# Patient Record
Sex: MALE | Race: WHITE | NOT HISPANIC OR LATINO | URBAN - METROPOLITAN AREA
[De-identification: names, ages, dates, MRNs, and addresses within clinical notes are randomized per-mention and may not be internally consistent; named-entity substitution may affect disease eponyms.]

---

## 2017-04-25 ENCOUNTER — HOSPITAL ENCOUNTER (EMERGENCY)
Facility: CLINIC | Age: 32
Discharge: HOME | End: 2017-04-25
Attending: EMERGENCY MEDICINE | Admitting: EMERGENCY MEDICINE
Payer: COMMERCIAL

## 2017-04-25 ENCOUNTER — APPOINTMENT (OUTPATIENT)
Dept: ULTRASOUND IMAGING | Facility: CLINIC | Age: 32
End: 2017-04-25
Attending: EMERGENCY MEDICINE
Payer: COMMERCIAL

## 2017-04-25 ENCOUNTER — TRANSFERRED RECORDS (OUTPATIENT)
Dept: HEALTH INFORMATION MANAGEMENT | Facility: CLINIC | Age: 32
End: 2017-04-25

## 2017-04-25 VITALS
SYSTOLIC BLOOD PRESSURE: 137 MMHG | TEMPERATURE: 99 F | DIASTOLIC BLOOD PRESSURE: 81 MMHG | RESPIRATION RATE: 16 BRPM | BODY MASS INDEX: 27.12 KG/M2 | OXYGEN SATURATION: 98 % | HEART RATE: 69 BPM | WEIGHT: 200 LBS

## 2017-04-25 DIAGNOSIS — R10.32 LEFT LOWER QUADRANT PAIN: ICD-10-CM

## 2017-04-25 LAB
ALBUMIN UR-MCNC: NEGATIVE MG/DL
ANION GAP SERPL CALCULATED.3IONS-SCNC: 4 MMOL/L (ref 3–14)
APPEARANCE UR: CLEAR
BACTERIA #/AREA URNS HPF: ABNORMAL /HPF
BILIRUB UR QL STRIP: NEGATIVE
BUN SERPL-MCNC: 16 MG/DL (ref 7–30)
CALCIUM SERPL-MCNC: 8.6 MG/DL (ref 8.5–10.1)
CHLORIDE SERPL-SCNC: 103 MMOL/L (ref 94–109)
CO2 SERPL-SCNC: 30 MMOL/L (ref 20–32)
COLOR UR AUTO: YELLOW
CREAT SERPL-MCNC: 1.02 MG/DL (ref 0.66–1.25)
GFR SERPL CREATININE-BSD FRML MDRD: 85 ML/MIN/1.7M2
GLUCOSE SERPL-MCNC: 125 MG/DL (ref 70–99)
GLUCOSE UR STRIP-MCNC: NEGATIVE MG/DL
HGB UR QL STRIP: ABNORMAL
KETONES UR STRIP-MCNC: NEGATIVE MG/DL
LEUKOCYTE ESTERASE UR QL STRIP: NEGATIVE
MUCOUS THREADS #/AREA URNS LPF: PRESENT /LPF
NITRATE UR QL: NEGATIVE
PH UR STRIP: 6 PH (ref 5–7)
POTASSIUM SERPL-SCNC: 3.8 MMOL/L (ref 3.4–5.3)
RBC #/AREA URNS AUTO: 2 /HPF (ref 0–2)
SODIUM SERPL-SCNC: 137 MMOL/L (ref 133–144)
SP GR UR STRIP: 1.01 (ref 1–1.03)
URN SPEC COLLECT METH UR: ABNORMAL
UROBILINOGEN UR STRIP-MCNC: 0 MG/DL (ref 0–2)
WBC #/AREA URNS AUTO: 1 /HPF (ref 0–2)

## 2017-04-25 PROCEDURE — 81001 URINALYSIS AUTO W/SCOPE: CPT | Performed by: EMERGENCY MEDICINE

## 2017-04-25 PROCEDURE — 96374 THER/PROPH/DIAG INJ IV PUSH: CPT

## 2017-04-25 PROCEDURE — 25000128 H RX IP 250 OP 636: Performed by: EMERGENCY MEDICINE

## 2017-04-25 PROCEDURE — 76770 US EXAM ABDO BACK WALL COMP: CPT

## 2017-04-25 PROCEDURE — 99284 EMERGENCY DEPT VISIT MOD MDM: CPT | Mod: 25

## 2017-04-25 PROCEDURE — 80048 BASIC METABOLIC PNL TOTAL CA: CPT | Performed by: EMERGENCY MEDICINE

## 2017-04-25 RX ORDER — KETOROLAC TROMETHAMINE 30 MG/ML
30 INJECTION, SOLUTION INTRAMUSCULAR; INTRAVENOUS ONCE
Status: COMPLETED | OUTPATIENT
Start: 2017-04-25 | End: 2017-04-25

## 2017-04-25 RX ORDER — DICYCLOMINE HCL 20 MG
20 TABLET ORAL 4 TIMES DAILY PRN
Qty: 16 TABLET | Refills: 0 | Status: SHIPPED | OUTPATIENT
Start: 2017-04-25 | End: 2017-05-05

## 2017-04-25 RX ADMIN — KETOROLAC TROMETHAMINE 30 MG: 30 INJECTION, SOLUTION INTRAMUSCULAR at 19:40

## 2017-04-25 ASSESSMENT — ENCOUNTER SYMPTOMS
NAUSEA: 1
CONSTIPATION: 0
HEMATURIA: 0
BLOOD IN STOOL: 0
ABDOMINAL PAIN: 1
DIARRHEA: 0
VOMITING: 0
FEVER: 0
DYSURIA: 0

## 2017-04-25 NOTE — ED AVS SNAPSHOT
[facility] Emergency Department    [address]    [facility] [address]    Phone:  [phone]    Fax:  [phone]                                       Bernardo Elliott   MRN: [id number]    Department:  [facility] Emergency Department   Date of Visit:  4/25/2017           After Visit Summary Signature Page     I have received my discharge instructions, and my questions have been answered. I have discussed any challenges I see with this plan with the nurse or doctor.    ..........................................................................................................................................  Patient/Patient Representative Signature      ..........................................................................................................................................  Patient Representative Print Name and Relationship to Patient    ..................................................               ................................................  Date                                            Time    ..........................................................................................................................................  Reviewed by Signature/Title    ...................................................              ..............................................  Date                                                            Time

## 2017-04-25 NOTE — ED AVS SNAPSHOT
[facility] Emergency Department    [address] [place] [address]    Phone:  [phone]    Fax:  [phone]                                       Bernardo Elliott   MRN: [id number]    Department:  [facility] Emergency Department   Date of Visit:  4/25/2017           Patient Information     Date Of Birth          1985        Your diagnoses for this visit were:     Left lower quadrant pain        You were seen by Cb Rivera MD.        Discharge Instructions       Please make an appointment to follow up with [facility] [phone] in 3-5 days even if entirely better.      Discharge Instructions  Abdominal Pain    Abdominal pain can be caused by many things. Your evaluation today does not show the exact cause for your pain. Your doctor today has decided that it is unlikely your pain is due to a life threatening problem, or a problem requiring surgery or hospital admission. Sometimes those problems cannot be found right away, so it is very important that you follow up as directed.  Sometimes only the changes which occur over time allow the cause of your pain to be found.    Return to the Emergency Department for a recheck in 8-12 hours if your pain continues.  If your pain gets worse, changes in location, or feels different, return to the Emergency Department right away.    ADULTS:  Return to the Emergency Department right away if:      You get an oral temperature above 102oF or as directed by your doctor.    You have blood in your stools (bright red or black, tarry stools).    You keep throwing up or can t drink liquids.    You see blood when you throw up.    You can t have a bowel movement or you can t pass gas.    Your stomach gets bloated or bigger.    Your skin or the whites of your eyes look yellow.    You faint.    You have bloody, frequent or painful urination.    You have new symptoms or anything that worries you.    CHILDREN:  Return to  the Emergency Department right away if your child has any of the above-listed symptoms or the following:      Pushes your hand away or screams/cries when his/her belly is touched.    You notice your child is very fussy or weak.    Your child is very tired and is too tired to eat or drink.    Your child is dehydrated.  Signs of dehydration can be:  o Your infant has had no wet diapers in 4-5 hours.  o Your older child has not passed urine in 6-8 hours.  o Your infant or child starts to have dry mouth and lips, or no saliva or tears.    PREGNANT WOMEN:  Return to the Emergency Department right away if you have any of the above-listed symptoms or the following:      You have bleeding, leaking fluid or passing tissue from the vagina.    You have worse pain or cramping, or pain in your shoulder or back.    You have vomiting that will not stop.    You have painful or bloody urination.    You have a temperature of 100oF or more.    Your baby is not moving as much as usual.    You faint.    You get a bad headache with or without eye problems and abdominal pain.    You have a convulsion or seizure.    You have unusual discharge from your vagina and abdominal pain.    Abdominal pain is pretty common during pregnancy.  Your pain may or may not be related to your pregnancy. You should follow-up closely with your OB doctor so they can evaluate you and your baby.  Until you follow-up with your regular doctor, do the following:       Avoid sex and do not put anything in your vagina.    Drink clear fluids.    Only take medications approved by your doctor.    MORE INFORMATION:    Appendicitis:  A possible cause of abdominal pain in any person who still has their appendix is acute appendicitis. Appendicitis is often hard to diagnose.  Testing does not always rule out early appendicitis or other causes of abdominal pain. Close follow-up with your doctor and re-evaluations may be needed to figure out the reason for your abdominal  "pain.    Follow-up:  It is very important that you make an appointment with your clinic and go to the appointment.  If you do not follow-up with your primary doctor, it may result in missing an important development which could result in permanent injury or disability and/or lasting pain.  If there is any problem keeping your appointment, call your doctor or return to the Emergency Department.    Medications:  Take your medications as directed by your doctor today.  Before using over-the-counter medications, ask your doctor and make sure to take the medications as directed.  If you have any questions about medications, ask your doctor.    Diet:  Resume your normal diet as much as possible, but do not eat fried, fatty or spicy foods while you have pain.  Do not drink alcohol or have caffeine.  Do not smoke tobacco.    Probiotics: If you have been given an antibiotic, you may want to also take a probiotic pill or eat yogurt with live cultures. Probiotics have \"good bacteria\" to help your intestines stay healthy. Studies have shown that probiotics help prevent diarrhea and other intestine problems (including C. diff infection) when you take antibiotics. You can buy these without a prescription in the pharmacy section of the store.     If you were given a prescription for medicine here today, be sure to read all of the information (including the package insert) that comes with your prescription.  This will include important information about the medicine, its side effects, and any warnings that you need to know about.  The pharmacist who fills the prescription can provide more information and answer questions you may have about the medicine.  If you have questions or concerns that the pharmacist cannot address, please call or return to the Emergency Department.           24 Hour Appointment Hotline       To make an appointment at any [facility], call [phone] ([phone]). If you don't have a family " doctor or clinic, we will help you find one. [facility] clinics are conveniently located to serve the needs of you and your family.             Review of your medicines      START taking        Dose / Directions Last dose taken    dicyclomine 20 MG tablet   Commonly known as:  BENTYL   Dose:  20 mg   Quantity:  16 tablet        Take 1 tablet (20 mg) by mouth 4 times daily as needed   Refills:  0          Our records show that you are taking the medicines listed below. If these are incorrect, please call your family doctor or clinic.        Dose / Directions Last dose taken    * CYMBALTA PO   Dose:  60 mg        Take 60 mg by mouth   Refills:  0        * DULoxetine 60 MG EC capsule   Commonly known as:  CYMBALTA   Dose:  60 mg   Quantity:  30 capsule        Take 1 capsule (60 mg) by mouth daily   Refills:  0        * MOBIC PO        Refills:  0        * meloxicam 7.5 MG tablet   Commonly known as:  MOBIC   Dose:  7.5 mg   Quantity:  30 tablet        Take 1 tablet (7.5 mg) by mouth daily   Refills:  1        * oxyCODONE 10 MG 12 hr tablet   Commonly known as:  OxyCONTIN   Dose:  10 mg        Take 10 mg by mouth every 12 hours   Refills:  0        * OXYCONTIN PO   Dose:  15 mg        Take 15 mg by mouth   Refills:  0        * oxyCODONE 20 MG 12 hr tablet   Commonly known as:  OxyCONTIN   Dose:  20 mg   Quantity:  60 tablet        Take 1 tablet (20 mg) by mouth every 12 hours   Refills:  0        VIAGRA PO   Dose:  100 mg        Take 100 mg by mouth as needed   Refills:  0        * Notice:  This list has 7 medication(s) that are the same as other medications prescribed for you. Read the directions carefully, and ask your doctor or other care provider to review them with you.            Prescriptions were sent or printed at these locations (1 Prescription)                   Other Prescriptions                Printed at Department/Unit printer (1 of 1)         dicyclomine (BENTYL) 20 MG tablet                Procedures and  tests performed during your visit     Basic metabolic panel (BMP)    Peripheral IV catheter    Retroperitoneal US    UA with Microscopic      Orders Needing Specimen Collection     None      Pending Results     Date and Time Order Name Status Description    4/25/2017 1928 Retroperitoneal US Preliminary             Pending Culture Results     No orders found from 4/23/2017 to 4/26/2017.            Test Results From Your Hospital Stay        4/25/2017  9:13 PM      Component Results     Component Value Ref Range & Units Status    Color Urine Yellow  Final    Appearance Urine Clear  Final    Glucose Urine Negative NEG mg/dL Final    Bilirubin Urine Negative NEG Final    Ketones Urine Negative NEG mg/dL Final    Specific Gravity Urine 1.012 1.003 - 1.035 Final    Blood Urine Small (A) NEG Final    pH Urine 6.0 5.0 - 7.0 pH Final    Protein Albumin Urine Negative NEG mg/dL Final    Urobilinogen mg/dL 0.0 0.0 - 2.0 mg/dL Final    Nitrite Urine Negative NEG Final    Leukocyte Esterase Urine Negative NEG Final    Source Midstream Urine  Final    WBC Urine 1 0 - 2 /HPF Final    RBC Urine 2 0 - 2 /HPF Final    Bacteria Urine Few (A) NEG /HPF Final    Mucous Urine Present (A) NEG /LPF Final         4/25/2017  7:59 PM      Component Results     Component Value Ref Range & Units Status    Sodium 137 133 - 144 mmol/L Final    Potassium 3.8 3.4 - 5.3 mmol/L Final    Chloride 103 94 - 109 mmol/L Final    Carbon Dioxide 30 20 - 32 mmol/L Final    Anion Gap 4 3 - 14 mmol/L Final    Glucose 125 (H) 70 - 99 mg/dL Final    Urea Nitrogen 16 7 - 30 mg/dL Final    Creatinine 1.02 0.66 - 1.25 mg/dL Final    GFR Estimate 85 >60 mL/min/1.7m2 Final    Non  GFR Calc    GFR Estimate If Black >90   GFR Calc   >60 mL/min/1.7m2 Final    Calcium 8.6 8.5 - 10.1 mg/dL Final         4/25/2017  8:36 PM      Narrative     US RENAL COMPLETE 4/25/2017 8:25 PM     HISTORY: LLQ pain. Evaluate for ureteral stone.    TECHNIQUE:  Renal ultrasound.    FINDINGS: Both kidneys are normal in size, shape, and echotexture. The  right kidney measures 13.3 x 4.9 x 5.8 cm with AP cortical thickness  of 1.5 cm. The left kidney measures 13.3 x 6.3 x 4.9 cm with AP  cortical thickness of 1.7 cm. There is no evidence for hydronephrosis.  No cystic or solid masses are present. There is no evidence for  urolithiasis. The bladder is distended and appears normal. Bilateral  ureteral jets are visualized.        Impression     IMPRESSION: Negative renal ultrasound.                  Clinical Quality Measure: Blood Pressure Screening     Your blood pressure was checked while you were in the emergency department today. The last reading we obtained was  BP: 137/81 . Please read the guidelines below about what these numbers mean and what you should do about them.  If your systolic blood pressure (the top number) is less than 120 and your diastolic blood pressure (the bottom number) is less than 80, then your blood pressure is normal. There is nothing more that you need to do about it.  If your systolic blood pressure (the top number) is 120-139 or your diastolic blood pressure (the bottom number) is 80-89, your blood pressure may be higher than it should be. You should have your blood pressure rechecked within a year by a primary care provider.  If your systolic blood pressure (the top number) is 140 or greater or your diastolic blood pressure (the bottom number) is 90 or greater, you may have high blood pressure. High blood pressure is treatable, but if left untreated over time it can put you at risk for heart attack, stroke, or kidney failure. You should have your blood pressure rechecked by a primary care provider within the next 4 weeks.  If your provider in the emergency department today gave you specific instructions to follow-up with your doctor or provider even sooner than that, you should follow that instruction and not wait for up to 4 weeks for your  "follow-up visit.        Thank you for choosing [facility]       Thank you for choosing [facility] for your care. Our goal is always to provide you with excellent care. Hearing back from our patients is one way we can continue to improve our services. Please take a few minutes to complete the written survey that you may receive in the mail after you visit with us. Thank you!        [employer] Information     [email or web address] lets you send messages to your doctor, view your test results, renew your prescriptions, schedule appointments and more. To sign up, go to [email or web address] . Click on \"Log in\" on the left side of the screen, which will take you to the Welcome page. Then click on \"Sign up Now\" on the right side of the page.     You will be asked to enter the access code listed below, as well as some personal information. Please follow the directions to create your username and password.     Your access code is: [email or web address]  Expires: 2017  9:25 PM     Your access code will  in 90 days. If you need help or a new code, please call your [facility] clinic or [phone].        Care EveryWhere ID     This is your Care EveryWhere ID. This could be used by other organizations to access your [facility] medical records  [id number]        After Visit Summary       This is your record. Keep this with you and show to your community pharmacist(s) and doctor(s) at your next visit.                  "

## 2017-04-26 NOTE — DISCHARGE INSTRUCTIONS
Please make an appointment to follow up with [facility] [phone] in 3-5 days even if entirely better.      Discharge Instructions  Abdominal Pain    Abdominal pain can be caused by many things. Your evaluation today does not show the exact cause for your pain. Your doctor today has decided that it is unlikely your pain is due to a life threatening problem, or a problem requiring surgery or hospital admission. Sometimes those problems cannot be found right away, so it is very important that you follow up as directed.  Sometimes only the changes which occur over time allow the cause of your pain to be found.    Return to the Emergency Department for a recheck in 8-12 hours if your pain continues.  If your pain gets worse, changes in location, or feels different, return to the Emergency Department right away.    ADULTS:  Return to the Emergency Department right away if:      You get an oral temperature above 102oF or as directed by your doctor.    You have blood in your stools (bright red or black, tarry stools).    You keep throwing up or can t drink liquids.    You see blood when you throw up.    You can t have a bowel movement or you can t pass gas.    Your stomach gets bloated or bigger.    Your skin or the whites of your eyes look yellow.    You faint.    You have bloody, frequent or painful urination.    You have new symptoms or anything that worries you.    CHILDREN:  Return to the Emergency Department right away if your child has any of the above-listed symptoms or the following:      Pushes your hand away or screams/cries when his/her belly is touched.    You notice your child is very fussy or weak.    Your child is very tired and is too tired to eat or drink.    Your child is dehydrated.  Signs of dehydration can be:  o Your infant has had no wet diapers in 4-5 hours.  o Your older child has not passed urine in 6-8 hours.  o Your infant or child starts to have dry mouth and lips, or no  saliva or tears.    PREGNANT WOMEN:  Return to the Emergency Department right away if you have any of the above-listed symptoms or the following:      You have bleeding, leaking fluid or passing tissue from the vagina.    You have worse pain or cramping, or pain in your shoulder or back.    You have vomiting that will not stop.    You have painful or bloody urination.    You have a temperature of 100oF or more.    Your baby is not moving as much as usual.    You faint.    You get a bad headache with or without eye problems and abdominal pain.    You have a convulsion or seizure.    You have unusual discharge from your vagina and abdominal pain.    Abdominal pain is pretty common during pregnancy.  Your pain may or may not be related to your pregnancy. You should follow-up closely with your OB doctor so they can evaluate you and your baby.  Until you follow-up with your regular doctor, do the following:       Avoid sex and do not put anything in your vagina.    Drink clear fluids.    Only take medications approved by your doctor.    MORE INFORMATION:    Appendicitis:  A possible cause of abdominal pain in any person who still has their appendix is acute appendicitis. Appendicitis is often hard to diagnose.  Testing does not always rule out early appendicitis or other causes of abdominal pain. Close follow-up with your doctor and re-evaluations may be needed to figure out the reason for your abdominal pain.    Follow-up:  It is very important that you make an appointment with your clinic and go to the appointment.  If you do not follow-up with your primary doctor, it may result in missing an important development which could result in permanent injury or disability and/or lasting pain.  If there is any problem keeping your appointment, call your doctor or return to the Emergency Department.    Medications:  Take your medications as directed by your doctor today.  Before using over-the-counter medications, ask your  "doctor and make sure to take the medications as directed.  If you have any questions about medications, ask your doctor.    Diet:  Resume your normal diet as much as possible, but do not eat fried, fatty or spicy foods while you have pain.  Do not drink alcohol or have caffeine.  Do not smoke tobacco.    Probiotics: If you have been given an antibiotic, you may want to also take a probiotic pill or eat yogurt with live cultures. Probiotics have \"good bacteria\" to help your intestines stay healthy. Studies have shown that probiotics help prevent diarrhea and other intestine problems (including C. diff infection) when you take antibiotics. You can buy these without a prescription in the pharmacy section of the store.     If you were given a prescription for medicine here today, be sure to read all of the information (including the package insert) that comes with your prescription.  This will include important information about the medicine, its side effects, and any warnings that you need to know about.  The pharmacist who fills the prescription can provide more information and answer questions you may have about the medicine.  If you have questions or concerns that the pharmacist cannot address, please call or return to the Emergency Department.         "

## 2017-04-26 NOTE — ED PROVIDER NOTES
History     Chief Complaint:  Abdominal Pain      HPI   Bernardo Elliott is a 31 year old male who presents with left lower quadrant abdominal pain. The patient reports yesterday noon after using the bathroom he developed sudden onset of left lower quadrant abdominal pain. His pain has been constant but worsens intermittently. When his pain worsens he does note some minor nausea and lying on his stomach helps. He was seen at urgent care earlier today for his discomfort but presents here for ongoing pain. He denies any radiation of his pain into the back or his groin/testicles. The patient noticed while driving his pain worsened. He denies fevers, vomiting, dysuria, diarrhea, constipation, bloody stools, or any associated symptoms.     Allergies:  The patient has no known allergies to medications.      Medications:    Oxycodone  Cymbalta  Viagra  Cymbalta  Meloxicam     Past Medical History:    Chronic back pain  Controlled substance agreement signed    Past Surgical History:    The patient has no pertinent surgical history.    Family History:    The patient has no pertinent family history.     Social History:  .  The patient currently smokes 1.00 ppd.  The patient consumes alcohol rarely.     Review of Systems   Constitutional: Negative for fever.   Gastrointestinal: Positive for abdominal pain and nausea. Negative for blood in stool, constipation, diarrhea and vomiting.   Genitourinary: Negative for dysuria and hematuria.   All other systems reviewed and are negative.    Physical Exam   First Vitals:  BP: (!) 144/92  Pulse: 87  Heart Rate: 87  Temp: 99  F (37.2  C)  Resp: 18  Weight: 90.7 kg (200 lb)  SpO2: 98 %    Physical Exam    Constitutional:  Pleasant, age appropriate.       Resting comfortably in the bed in no obvious pain.  HEENT:    Oropharynx is moist, without lesions or trismus.  Eyes:    Conjunctiva normal, PERRL  Neck:    Supple, no meningismus.     CV:     Regular rate and rhythm.      No  murmurs, rubs or gallops.     No lower extremity edema.  PULM:    Clear to auscultation bilateral.       No respiratory distress.      Good air exchange.     No rales or wheezing.     No stridor.  ABD:    Soft, non-distended.       Minimal tenderness in the LLQ.     Bowel sounds normal.     No pulsatile masses.       No rebound, guarding or rigidity.     No CVA tenderness.      No hepatosplenomegaly.  MSK:     No gross deformity to all four extremities.   LYMPH:   No cervical lymphadenopathy.  NEURO:   Alert.  Good muscular tone, no atrophy.   Skin:    Warm, dry and intact.    Psych:    Mood is good and affect is appropriate.      Emergency Department Course   Imaging:  Radiographic findings were communicated with the patient who voiced understanding of the findings.    US Retroperitoneal:  Negative renal ultrasound.  Results per radiology.     Laboratory:  BMP:  (H), ow wnl (Creat 1.02)  UA with Micro: Blood small, Bacteria few, mucous present, ow wnl    Interventions:  Toradol 30 mg IV    Emergency Department Course:  Nursing notes and vitals reviewed.  I performed an exam of the patient as documented above.     The work up above was undertaken.     The patient received the intervention(s) above.     Findings and plan explained to the Patient. Patient discharged home with instructions regarding supportive care, medications, and reasons to return. The importance of close follow-up was reviewed.     Impression & Plan    Medical Decision Making:  Bernardo Elliott is a 31 year old male who presents to the emergency department with left lower quadrant abdominal pain. On examination he appears well. His abdominal examination is benign. UA does not show any signs of infection or significant hematuria. Renal ultrasound with no evidence of hydronephrosis to cause increased concern for ureteral colic. Clinical history is not suggestive of diverticulitis. I do not feel that further advanced imaging with CT scan is  necessary. The differential diagnosis would include mesenteric adenitis, colitis, enteritis, abdominal wall strain. He is safe for discharge to home. Supportive treatment indicated. Follow up with PCP in 3-5 days. Return for worsening symptoms.     Diagnosis:    ICD-10-CM    1. Left lower quadrant pain R10.32        Disposition:  Discharged.    Discharge Medications:  New Prescriptions    DICYCLOMINE (BENTYL) 20 MG TABLET    Take 1 tablet (20 mg) by mouth 4 times daily as needed         Jasmeet SCHNEIDER, am serving as a scribe at 9:23 PM on 4/25/2017 to document services personally performed by Dr. Rivera, based on my observations and the provider's statements to me.    [facility] EMERGENCY DEPARTMENT       Cb Rivera MD  04/26/17 1428

## 2017-04-26 NOTE — ED NOTES
Pt with mid-abdominal pain which started yesterday, denies n/v/d. Denies fevers. ABC's intact, alert and oriented X3.

## 2017-11-10 ENCOUNTER — RECORDS - HEALTHEAST (OUTPATIENT)
Dept: ADMINISTRATIVE | Facility: OTHER | Age: 32
End: 2017-11-10

## 2023-01-01 ENCOUNTER — HOSPITAL ENCOUNTER (EMERGENCY)
Facility: CLINIC | Age: 38
End: 2023-04-14
Attending: EMERGENCY MEDICINE | Admitting: EMERGENCY MEDICINE
Payer: COMMERCIAL

## 2023-01-01 VITALS — BODY MASS INDEX: 30.44 KG/M2 | WEIGHT: 224.43 LBS

## 2023-01-01 DIAGNOSIS — I46.9 CARDIAC ARREST (H): ICD-10-CM

## 2023-01-01 LAB
ANION GAP BLD CALCULATED.3IONS-SCNC: 17 MMOL/L (ref 3–14)
ANION GAP SERPL CALCULATED.3IONS-SCNC: 19 MMOL/L (ref 7–15)
APTT PPP: 39 SECONDS (ref 22–38)
BASE EXCESS BLDV CALC-SCNC: -12 MMOL/L (ref -7.7–1.9)
BUN SERPL-MCNC: 17.8 MG/DL (ref 6–20)
BUN SERPL-MCNC: 22 MG/DL (ref 7–30)
CA-I BLD-MCNC: 4.8 MG/DL (ref 4.4–5.2)
CALCIUM SERPL-MCNC: 9 MG/DL (ref 8.6–10)
CHLORIDE BLD-SCNC: 99 MMOL/L (ref 94–109)
CHLORIDE SERPL-SCNC: 100 MMOL/L (ref 98–107)
CO2 BLD-SCNC: 28 MMOL/L (ref 20–32)
CREAT BLD-MCNC: 1.3 MG/DL (ref 0.7–1.3)
CREAT SERPL-MCNC: 1.22 MG/DL (ref 0.67–1.17)
DEPRECATED HCO3 PLAS-SCNC: 23 MMOL/L (ref 22–29)
ERYTHROCYTE [DISTWIDTH] IN BLOOD BY AUTOMATED COUNT: 12.5 % (ref 10–15)
GFR SERPL CREATININE-BSD FRML MDRD: 78 ML/MIN/1.73M2
GLUCOSE BLD-MCNC: 308 MG/DL (ref 70–99)
GLUCOSE SERPL-MCNC: 307 MG/DL (ref 70–99)
HCO3 BLDV-SCNC: 23 MMOL/L (ref 21–28)
HCT VFR BLD AUTO: 43.6 % (ref 40–53)
HCT VFR BLD CALC: 41 % (ref 40–53)
HGB BLD-MCNC: 13.9 G/DL (ref 13.3–17.7)
HGB BLD-MCNC: 14.6 G/DL (ref 13.3–17.7)
HOLD SPECIMEN: NORMAL
INR PPP: 1.18 (ref 0.85–1.15)
LACTATE SERPL-SCNC: 11.2 MMOL/L (ref 0.7–2)
MAGNESIUM SERPL-MCNC: 2.4 MG/DL (ref 1.7–2.3)
MCH RBC QN AUTO: 32.2 PG (ref 26.5–33)
MCHC RBC AUTO-ENTMCNC: 33.5 G/DL (ref 31.5–36.5)
MCV RBC AUTO: 96 FL (ref 78–100)
O2/TOTAL GAS SETTING VFR VENT: 10 %
OXYHGB MFR BLDV: 3 % (ref 70–75)
PCO2 BLDV: 102 MM HG (ref 40–50)
PH BLDV: 6.95 [PH] (ref 7.32–7.43)
PHOSPHATE SERPL-MCNC: 6.9 MG/DL (ref 2.5–4.5)
PLATELET # BLD AUTO: 162 10E3/UL (ref 150–450)
PO2 BLDV: <10 MM HG (ref 25–47)
POTASSIUM BLD-SCNC: 3.2 MMOL/L (ref 3.4–5.3)
POTASSIUM SERPL-SCNC: 3.9 MMOL/L (ref 3.4–5.3)
RBC # BLD AUTO: 4.54 10E6/UL (ref 4.4–5.9)
SODIUM BLD-SCNC: 140 MMOL/L (ref 133–144)
SODIUM SERPL-SCNC: 142 MMOL/L (ref 136–145)
TROPONIN T SERPL HS-MCNC: 7 NG/L
WBC # BLD AUTO: 7.1 10E3/UL (ref 4–11)

## 2023-01-01 PROCEDURE — 99285 EMERGENCY DEPT VISIT HI MDM: CPT | Mod: 25

## 2023-01-01 PROCEDURE — 76604 US EXAM CHEST: CPT

## 2023-01-01 PROCEDURE — 92950 HEART/LUNG RESUSCITATION CPR: CPT

## 2023-01-01 PROCEDURE — 80048 BASIC METABOLIC PNL TOTAL CA: CPT | Performed by: EMERGENCY MEDICINE

## 2023-01-01 PROCEDURE — 84100 ASSAY OF PHOSPHORUS: CPT | Performed by: EMERGENCY MEDICINE

## 2023-01-01 PROCEDURE — 84484 ASSAY OF TROPONIN QUANT: CPT | Performed by: EMERGENCY MEDICINE

## 2023-01-01 PROCEDURE — 83605 ASSAY OF LACTIC ACID: CPT | Mod: 91

## 2023-01-01 PROCEDURE — 31500 INSERT EMERGENCY AIRWAY: CPT

## 2023-01-01 PROCEDURE — 999N000157 HC STATISTIC RCP TIME EA 10 MIN

## 2023-01-01 PROCEDURE — 85730 THROMBOPLASTIN TIME PARTIAL: CPT | Performed by: EMERGENCY MEDICINE

## 2023-01-01 PROCEDURE — 83605 ASSAY OF LACTIC ACID: CPT | Performed by: EMERGENCY MEDICINE

## 2023-01-01 PROCEDURE — 99291 CRITICAL CARE FIRST HOUR: CPT

## 2023-01-01 PROCEDURE — 85610 PROTHROMBIN TIME: CPT | Performed by: EMERGENCY MEDICINE

## 2023-01-01 PROCEDURE — 85014 HEMATOCRIT: CPT

## 2023-01-01 PROCEDURE — 83735 ASSAY OF MAGNESIUM: CPT | Performed by: EMERGENCY MEDICINE

## 2023-01-01 PROCEDURE — 36415 COLL VENOUS BLD VENIPUNCTURE: CPT | Performed by: EMERGENCY MEDICINE

## 2023-01-01 PROCEDURE — 82805 BLOOD GASES W/O2 SATURATION: CPT | Performed by: EMERGENCY MEDICINE

## 2023-01-01 PROCEDURE — 85027 COMPLETE CBC AUTOMATED: CPT | Performed by: EMERGENCY MEDICINE

## 2023-01-01 ASSESSMENT — ACTIVITIES OF DAILY LIVING (ADL)
ADLS_ACUITY_SCORE: 35

## 2023-04-14 NOTE — PROGRESS NOTES
Pt arrived to ED 2 as a RED TEAM with Lucus resuscitation in placed.  MD intubated pt without difficulty with ETT size 7.5, 24 at the teeth.  Positive ETCO2 monitoring.    Pt BMV via ETT t/o Lucus resuscitation  Has ETCO2, ResQpod and PEEP valve inline with BMV.  ETCo2 readings 5-11 t/o resuscitation. Sat 70-80's with poor perfusion.     At 0915: stopped resuscitation per MD.   Refer to resuscitation flow sheet and MD's note for more detail.

## 2023-04-14 NOTE — PROGRESS NOTES
"[facility] Progress Note  ED     Responded to on call page for family support after patient's death.    Many family members gathered in room; including parents, wife, brothers and their spouses.    Patient's children are at school and have not yet been notified.  Family declined child life services at this time.    Family is Mormon, but is open to other Roman Catholic traditions.    Gathered family around patient for \"I love yous and thank yous.\"    Blessed patient's body at family request.  Included scriptures Jn 14:2... Donnell said, \"In my Father's house are many mansions.  If this were not so, would I not have told you that I go to prepare a place for you;\" and Rev 21:4... \"In the fullness of time, there will be no more sorrow or crying or sadness or pain.  The things of the past will be gone.\"    No further needs at this time.  [facility]S remains available upon request.    Thanks be to God for the life of Bernardo Elliott.  May eternal light shine on him and may he Rest in Peace.        Rev. Charity Johnson M.Div.  Staff   Phone  [phone]    "

## 2023-04-14 NOTE — ED TRIAGE NOTES
Red team activation. Witnessed cardiac arrest. CPR in process 25 minutes prior to arrival. 5 rounds of epi given PTA. Current rhythm asystole.      Triage Assessment     Row Name 04/14/23 0917       Triage Assessment (Adult)    Airway WDL X       Respiratory WDL    Respiratory WDL X;all       Cardiac WDL    Cardiac WDL X;all       Cognitive/Neuro/Behavioral WDL    Cognitive/Neuro/Behavioral WDL X;all

## 2023-04-14 NOTE — ED PROVIDER NOTES
History   Chief Complaint:  Cardiac Arrest     The history is provided by the EMS personnel. The history is limited by the condition of the patient.      Bernardo Elliott is a 37 year old male with a history of opioid dependence, on Suboxone, and chronic pain syndrome who presents with in cardiac arrest with WARD running. EMS reports that this morning, the patient told his girlfriend he was experiencing palpitations and shortness of breath, which then led to the patient going into a witnessed cardiac arrest. EMS states that they were called around 0818 and CPR was began around 0821. Initially patient was in Vfib and then Vtach, but was in asystole on arrival to the ED. EMS notes that the patient has a history of drug use, but girlfriend denied any drug use this morning or suspicious behavior. EMS gave 2 mg of Narcan with no results. They also gave 5 rounds of epi and bicarb, as well as 450 mg total of amiodarone. They shocked the patient 5 times. They add that the patient initially had an ET tube placed, however he vomited and dislodged the tube, so on arrival he had an iGel in place.     Independent Historian:   EMS - They report history, as above.    Review of External Notes: None     ROS:  Review of Systems   Unable to perform ROS: Patient unresponsive     Allergies:  No Known Allergies     Medications:    Buprenorphine-naloxone  Clonidine  Famotidine  Fluoxetine  Hydroxyzine pamoate  Trazodone  Propanolol  Methocarbamol    Past Medical History:    Chronic back pain  Opioid dependence  Chronic pain syndrome  Erectile dysfunction  Nondependent alcohol use disorder    Family History:    Father - alcohol use disorder, hypertension, back pain    Social History:  Presents alone  Presents via EMS    Physical Exam   No data found.     Physical Exam    General:   Age-appropriate  male undergoing CPR with Ward device  HEENT:    Igel in place  Eyes:    Pupils are 6 mm and fixed bilateral  Neck:    Supple, no  meningismus.     CV:     No audible cardiac activity     No palpable carotid or femoral pulse     No unilateral lower extremity edema.  PULM:    Lungs are equal and symmetric with vague ventilation     Symmetric chest rise  ABD:    Soft, non-distended.       No pulsatile masses.    MSK:     No gross deformity to all four extremities.   NEURO:   GCS: E1 V1 M1 = 3     No spontaneous respirations or motor movement     No tremor  Skin:    Cool and mottled     Dependent discoloration to the arms and flank        Emergency Department Course   Laboratory:  Labs Ordered and Resulted from Time of ED Arrival to Time of ED Departure   ISTAT BASIC CHEM ICA HEMATOCRIT POCT - Abnormal       Result Value    Chloride POCT 99      Potassium POCT 3.2 (*)     Sodium POCT 140      UREA NITROGEN POCT 22      Calcium, Ionized Whole Blood POCT 4.8      Glucose Whole Blood POCT 308 (*)     Anion Gap POCT 17.0 (*)     Hemoglobin POCT 13.9      Hematocrit POCT 41      Creatinine POCT 1.3      TOTAL CO2 POCT 28     CBC WITH PLATELETS - Normal    WBC Count 7.1      RBC Count 4.54      Hemoglobin 14.6      Hematocrit 43.6      MCV 96      MCH 32.2      MCHC 33.5      RDW 12.5      Platelet Count 162     BASIC METABOLIC PANEL   TROPONIN T, HIGH SENSITIVITY   LACTIC ACID WHOLE BLOOD   INR   PARTIAL THROMBOPLASTIN TIME   BLOOD GAS VENOUS WITH OXYHEMOGLOBIN   MAGNESIUM   PHOSPHORUS   BASIC METABOLIC PANEL     -Intubation    Date/Time: 4/14/2023 9:00 AM    Performed by: Cb Rivera MD  Authorized by: Cb Rivera MD    Emergent condition/consent implied    Pre-procedure details:     Indications: cardio/pulmonary arrest      Patient status:  Unresponsive    Mallampati score:  II    Obstruction: none    Procedure details:     Preoxygenation:  Supraglottic device (iGel)    CPR in progress: yes      Number of attempts:  1  Successful intubation attempt details:     Intubation method:  Oral    Intubation technique: video assisted       Laryngoscope blade:  Mac 4    Bougie used: no      Tube size (mm):  7.5    Tube type:  Cuffed  Placement assessment:     ETT at teeth/gumline (cm):  25    Tube secured with:  ETT melvin       [facility] Procedure Note      Limited Bedside ED Cardiac Ultrasound:     PROCEDURE: PERFORMED BY: Dr. Cb Rivera MD  INDICATIONS/SYMPTOM:  Pulseless on exam/Cardiac arrest  PROBE: Cardiac phased array probe  BODY LOCATION: Chest  FINDINGS:   The ultrasound was performed utilizing the subcostal views.  Cardiac contractility:  Absent  Pericardial Effusion:  None  RV:LV ratio: LV > RV  INTERPRETATION:    No pericardial effusion or spontaneous cardiac contractility  IMAGE DOCUMENTATION: Images were archived to hard drive.        Emergency Department Course & Assessments:     Assessments:  0855 EMS arrives and gave history. RAOUL in place on arrival.   0859 Patient successfully intubated.  0901 RAOUL stopped. Pulse check. No pulse found. Asystole on monitor. No cardiac activity on ultrasound.  0902 RAOUL restarted.   0905 2 mg of Narcan given.  0905 RAOUL stopped. Pulse check. Patient continues to be in asystole.   0906 RAOUL restarted.  0910 RAOUL stopped. Pulse check. Patient continues to be in asystole.  0910 RAOUL restarted.  0912 Family arrived to ED. However, family then left.  0915 RAOUL stopped. Pulse check. No cardiac activity on ultrasound.   0915 Time of death.  0933 I spoke with family, who has now come to bedside.    Independent Interpretation (X-rays, CTs, rhythm strip):  Not applicable    Consultations/Discussion of Management or Tests:  None     Social Determinants of Health affecting care:   None    Disposition:  The patient .     Impression & Plan    Medical Decision Makin-year-old male presents with out of hospital cardiac arrest in which he was noted to be in V-fib and V. tach.  He did not improve with amiodarone, multiple defibrillation attempts, IV bicarbonate and 5 rounds of  epinephrine.  As patient presented to the ED he remained in cardiac arrest but was now in asystole.  Supraglottic airway device was changed for formal endotracheal intubation.  There is no pericardial effusion, hypoglycemia, hyperkalemia, anemia, electrolyte disturbance noted on evaluation.  No clinical signs of pneumothorax.  No reversible causes noted. Patient was given additional 2 mg of Narcan with no change.    He has undergone a total of 50 minutes of CPR with no clinical signs of life.  End-tidal CO2 is now declining from 35 in the field down to 5.  Pupils are fixed and dilated with no corneal reflex.  Repeat ultrasound reveals no cardiac activity.  Time of death 9:15 AM.    Total critical care time excluding procedures: 30 minutes    Diagnosis:    ICD-10-CM    1. Cardiac arrest (H)  I46.9            Scribe Disclosure:  Len SCHNEIDER, amy serving as a scribe at 9:08 AM on 4/14/2023 to document services personally performed by Cb Rivera MD based on my observations and the provider's statements to me.        Cb Rivera MD  04/14/23 1211

## 2023-04-14 NOTE — ED NOTES
Red Team Recordin- 18g PIV placed in L AC    0859- ET tube placed    0901- Pulse check- asystole, No cardiac rhythm on bedside ultrasound    0905- 2mg Narcan given IV, 1L NS started    0906- Pulse check- asystole    0910- Pulse check- asystole    0915- Pulse check- asystole, no cardiac rhythm on bedside US. Time of death called.

## 2023-04-17 LAB
HCO3 BLDV-SCNC: 25 MMOL/L (ref 21–28)
LACTATE BLD-SCNC: 9.4 MMOL/L
PCO2 BLDV: 100 MM HG (ref 40–50)
PH BLDV: 7.01 [PH] (ref 7.32–7.43)
PO2 BLDV: <15 MM HG (ref 25–47)